# Patient Record
Sex: MALE | Race: WHITE | NOT HISPANIC OR LATINO
[De-identification: names, ages, dates, MRNs, and addresses within clinical notes are randomized per-mention and may not be internally consistent; named-entity substitution may affect disease eponyms.]

---

## 2020-05-11 ENCOUNTER — TRANSCRIPTION ENCOUNTER (OUTPATIENT)
Age: 40
End: 2020-05-11

## 2022-01-06 ENCOUNTER — OUTPATIENT (OUTPATIENT)
Dept: OUTPATIENT SERVICES | Facility: HOSPITAL | Age: 42
LOS: 1 days | Discharge: HOME | End: 2022-01-06
Payer: COMMERCIAL

## 2022-01-06 VITALS
SYSTOLIC BLOOD PRESSURE: 105 MMHG | HEIGHT: 71 IN | DIASTOLIC BLOOD PRESSURE: 73 MMHG | TEMPERATURE: 98 F | HEART RATE: 69 BPM | OXYGEN SATURATION: 99 % | WEIGHT: 201.06 LBS

## 2022-01-06 DIAGNOSIS — Z01.818 ENCOUNTER FOR OTHER PREPROCEDURAL EXAMINATION: ICD-10-CM

## 2022-01-06 DIAGNOSIS — M54.16 RADICULOPATHY, LUMBAR REGION: ICD-10-CM

## 2022-01-06 DIAGNOSIS — M77.9 ENTHESOPATHY, UNSPECIFIED: Chronic | ICD-10-CM

## 2022-01-06 LAB
ANION GAP SERPL CALC-SCNC: 4 MMOL/L — LOW (ref 5–17)
APTT BLD: 36.7 SEC — HIGH (ref 27.5–35.5)
BLD GP AB SCN SERPL QL: SIGNIFICANT CHANGE UP
BUN SERPL-MCNC: 13 MG/DL — SIGNIFICANT CHANGE UP (ref 7–23)
CALCIUM SERPL-MCNC: 9.1 MG/DL — SIGNIFICANT CHANGE UP (ref 8.5–10.1)
CHLORIDE SERPL-SCNC: 105 MMOL/L — SIGNIFICANT CHANGE UP (ref 96–108)
CO2 SERPL-SCNC: 31 MMOL/L — SIGNIFICANT CHANGE UP (ref 22–31)
CREAT SERPL-MCNC: 1.07 MG/DL — SIGNIFICANT CHANGE UP (ref 0.5–1.3)
GLUCOSE SERPL-MCNC: 78 MG/DL — SIGNIFICANT CHANGE UP (ref 70–99)
HCT VFR BLD CALC: 44.9 % — SIGNIFICANT CHANGE UP (ref 39–50)
HGB BLD-MCNC: 15.2 G/DL — SIGNIFICANT CHANGE UP (ref 13–17)
INR BLD: 1.06 RATIO — SIGNIFICANT CHANGE UP (ref 0.88–1.16)
MCHC RBC-ENTMCNC: 29.7 PG — SIGNIFICANT CHANGE UP (ref 27–34)
MCHC RBC-ENTMCNC: 33.9 GM/DL — SIGNIFICANT CHANGE UP (ref 32–36)
MCV RBC AUTO: 87.9 FL — SIGNIFICANT CHANGE UP (ref 80–100)
NRBC # BLD: 0 /100 WBCS — SIGNIFICANT CHANGE UP (ref 0–0)
PLATELET # BLD AUTO: 256 K/UL — SIGNIFICANT CHANGE UP (ref 150–400)
POTASSIUM SERPL-MCNC: 3.8 MMOL/L — SIGNIFICANT CHANGE UP (ref 3.5–5.3)
POTASSIUM SERPL-SCNC: 3.8 MMOL/L — SIGNIFICANT CHANGE UP (ref 3.5–5.3)
PROTHROM AB SERPL-ACNC: 12.3 SEC — SIGNIFICANT CHANGE UP (ref 10.6–13.6)
RBC # BLD: 5.11 M/UL — SIGNIFICANT CHANGE UP (ref 4.2–5.8)
RBC # FLD: 12.5 % — SIGNIFICANT CHANGE UP (ref 10.3–14.5)
SODIUM SERPL-SCNC: 140 MMOL/L — SIGNIFICANT CHANGE UP (ref 135–145)
WBC # BLD: 9.2 K/UL — SIGNIFICANT CHANGE UP (ref 3.8–10.5)
WBC # FLD AUTO: 9.2 K/UL — SIGNIFICANT CHANGE UP (ref 3.8–10.5)

## 2022-01-06 PROCEDURE — 93010 ELECTROCARDIOGRAM REPORT: CPT

## 2022-01-06 RX ORDER — MULTIVIT-MIN/FERROUS GLUCONATE 9 MG/15 ML
1 LIQUID (ML) ORAL
Qty: 0 | Refills: 0 | DISCHARGE

## 2022-01-06 NOTE — H&P PST ADULT - HISTORY OF PRESENT ILLNESS
41 year old male with no significant  past medical history c/o lower back pain that raidates down left leg.  He is scheduled for a a left L3-4 antoinette laminectomy foraminotomy, partial facetomy, discectomy with image on 1/25/2022.    He denies fever, cough, SOB, recent travels, and sick contacts.

## 2022-01-06 NOTE — H&P PST ADULT - PROBLEM SELECTOR PLAN 2
labs - cbc,pt/ptt,bmp,t&s,nose cx,ekg  M/C required  preop 3 day Hibiclens instruction reviewed and given .instructed on if  nose cx positive use Mupirocin 5 days and checklist given  take routine meds DOS with sips of water. avoid NSAID and OTC supplements. verbalized understanding  information on proper nutrition , increase protein and better food choices provided in packet   Spine surgery packet

## 2022-01-06 NOTE — H&P PST ADULT - ASSESSMENT
41 year old male with no significant  past medical history c/o lower back pain that radiates down left leg.  He is scheduled for a a left L3-4 antoinette laminectomy foraminotomy, partial facetomy, discectomy with image on 2022.    CAPRINI SCORE [CLOT]    AGE RELATED RISK FACTORS                                                       MOBILITY RELATED FACTORS  [ ] Age 41-60 years                                            (1 Point)                  [ ] Bed rest                                                        (1 Point)  [ ] Age: 61-74 years                                           (2 Points)                 [ ] Plaster cast                                                   (2 Points)  [ ] Age= 75 years                                              (3 Points)                 [ ] Bed bound for more than 72 hours                 (2 Points)    DISEASE RELATED RISK FACTORS                                               GENDER SPECIFIC FACTORS  [ ] Edema in the lower extremities                       (1 Point)                  [ ] Pregnancy                                                     (1 Point)  [ ] Varicose veins                                               (1 Point)                  [ ] Post-partum < 6 weeks                                   (1 Point)             [x ] BMI > 25 Kg/m2                                            (1 Point)                  [ ] Hormonal therapy  or oral contraception          (1 Point)                 [ ] Sepsis (in the previous month)                        (1 Point)                  [ ] History of pregnancy complications                 (1 point)  [ ] Pneumonia or serious lung disease                                               [ ] Unexplained or recurrent                     (1 Point)           (in the previous month)                               (1 Point)  [ ] Abnormal pulmonary function test                     (1 Point)                 SURGERY RELATED RISK FACTORS  [ ] Acute myocardial infarction                              (1 Point)                 [ ]  Section                                             (1 Point)  [ ] Congestive heart failure (in the previous month)  (1 Point)               [ ] Minor surgery                                                  (1 Point)   [ ] Inflammatory bowel disease                             (1 Point)                 [ ] Arthroscopic surgery                                        (2 Points)  [ ] Central venous access                                      (2 Points)                [x ] General surgery lasting more than 45 minutes   (2 Points)       [ ] Stroke (in the previous month)                          (5 Points)               [ ] Elective arthroplasty                                         (5 Points)                                                                                                                                               HEMATOLOGY RELATED FACTORS                                                 TRAUMA RELATED RISK FACTORS  [ ] Prior episodes of VTE                                     (3 Points)                [ ] Fracture of the hip, pelvis, or leg                       (5 Points)  [ ] Positive family history for VTE                         (3 Points)                 [ ] Acute spinal cord injury (in the previous month)  (5 Points)  [ ] Prothrombin 75119 A                                     (3 Points)                 [ ] Paralysis  (less than 1 month)                             (5 Points)  [ ] Factor V Leiden                                             (3 Points)                  [ ] Multiple Trauma within 1 month                        (5 Points)  [ ] Lupus anticoagulants                                     (3 Points)                                                           [ ] Anticardiolipin antibodies                               (3 Points)                                                       [ ] High homocysteine in the blood                      (3 Points)                                             [ ] Other congenital or acquired thrombophilia      (3 Points)                                                [ ] Heparin induced thrombocytopenia                  (3 Points)                                          Total Score [      3    ]    Caprini Score 0 - 2:  Low Risk, No VTE Prophylaxis required for most patients, encourage ambulation  Caprini Score 3 - 6:  At Risk, pharmacologic VTE prophylaxis is indicated for most patients (in the absence of a contraindication)  Caprini Score Greater than or = 7:  High Risk, pharmacologic VTE prophylaxis is indicated for most patients (in the absence of a contraindication)

## 2022-01-06 NOTE — H&P PST ADULT - PROBLEM SELECTOR PLAN 1
left L3-4 antoinette laminectomy foraminotomy, partial facetomy, discectomy with image on 1/25/2022.

## 2022-01-07 LAB
A1C WITH ESTIMATED AVERAGE GLUCOSE RESULT: 5.2 % — SIGNIFICANT CHANGE UP (ref 4–5.6)
ESTIMATED AVERAGE GLUCOSE: 103 MG/DL — SIGNIFICANT CHANGE UP (ref 68–114)
MRSA PCR RESULT.: SIGNIFICANT CHANGE UP
S AUREUS DNA NOSE QL NAA+PROBE: SIGNIFICANT CHANGE UP

## 2022-01-24 ENCOUNTER — TRANSCRIPTION ENCOUNTER (OUTPATIENT)
Age: 42
End: 2022-01-24

## 2022-01-25 ENCOUNTER — OUTPATIENT (OUTPATIENT)
Dept: OUTPATIENT SERVICES | Facility: HOSPITAL | Age: 42
LOS: 1 days | Discharge: HOME | End: 2022-01-25

## 2022-01-25 ENCOUNTER — RESULT REVIEW (OUTPATIENT)
Age: 42
End: 2022-01-25

## 2022-01-25 DIAGNOSIS — M51.16 INTERVERTEBRAL DISC DISORDERS WITH RADICULOPATHY, LUMBAR REGION: ICD-10-CM

## 2022-01-25 DIAGNOSIS — M54.16 RADICULOPATHY, LUMBAR REGION: ICD-10-CM

## 2022-01-25 DIAGNOSIS — M77.9 ENTHESOPATHY, UNSPECIFIED: Chronic | ICD-10-CM

## 2022-01-31 PROBLEM — Z78.9 OTHER SPECIFIED HEALTH STATUS: Chronic | Status: ACTIVE | Noted: 2022-01-06

## 2022-04-07 PROBLEM — Z00.00 ENCOUNTER FOR PREVENTIVE HEALTH EXAMINATION: Status: ACTIVE | Noted: 2022-04-07

## 2022-04-27 ENCOUNTER — APPOINTMENT (OUTPATIENT)
Dept: ORTHOPEDIC SURGERY | Facility: CLINIC | Age: 42
End: 2022-04-27
Payer: COMMERCIAL

## 2022-04-27 VITALS — HEIGHT: 71 IN | WEIGHT: 195 LBS | BODY MASS INDEX: 27.3 KG/M2

## 2022-04-27 DIAGNOSIS — Z78.9 OTHER SPECIFIED HEALTH STATUS: ICD-10-CM

## 2022-04-27 PROCEDURE — 99214 OFFICE O/P EST MOD 30 MIN: CPT

## 2022-04-27 NOTE — HISTORY OF PRESENT ILLNESS
[Lower back] : lower back [10] : 10 [0] : 0 [de-identified] : 1/25/22 LEFT L3/4 DISCECTOMY\par 2v lumbar spine- DDD L3/4 crest at L4/5\par MRI L spine 11/5/21-\par T12-L1: Right-sided disc herniation without compression of the conus medullaris or right L1 nerve root in the lateral\par recess.\par L1-2: Normal.\par L2-3: Disc bulge with a central right-sided disc herniation with significant compression of the thecal sac and the right\par L3 nerve root in the lateral recess.\par L3-4: Disc bulge with a left-sided disc herniation with significant compression of the thecal sac and the left L4 nerve\par root in the lateral recess.\par L4-5: Mild right-sided facet osteoarthrosis.\par L5-S1: Normal.\par Ind. review- L L3/4 HNP w/ LR narrowing R paracentral HNP L2/3 with LR narrowing\par \par Here as NC from SIMEON Umanzor, see his notes below:\par 10-18-21- He states 3 month h/o left sided back pain with posterior leg radicular complaints down the leg to the level\par of the knee. symptoms come on after playing some help with otc nsaids. 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 Doing PT, mild relief.\par 12/14/21- no change in sxs down the LLE posterior thigh\par 1/18/22- still LBP with pain radiating down the LLE \par 2/8/22- back soreness and reduced pain down the LLE\par 3/8/22-pain remains the same since last visit. Some days better than others. Pain in LLE has worsened. Did\par acupuncture last week unsure if provided relief.\par 4/27/22- has had formal PT for 6 weeks, has been taking diclofenac for LLE pain. Still describing pain down the LLE from the glutes to lateral leg [de-identified] : PT

## 2022-04-27 NOTE — ASSESSMENT
[FreeTextEntry1] : 1/25/22 LEFT L3/4 DISCECTOMY\par Did have have some reprieve from his LLE radicular pain, though has worsened over the past several months\par Will send for MRI w/ w/o contrast to eval for recurrent HNP/stenosis

## 2022-04-27 NOTE — IMAGING
[de-identified] : Back, including spine: Inspection of the thoracic/lumbar spine is as follows: No skin discoloration. Palpation of the\par thoracic/lumbar spine is as follows: left lumbar paraspinal tenderness. Range of motion of the thoracic and\par lumbar spine is as follows: full range of motion with mild stiffness. Strength Testing of the thoracic and lumbar\par spine is as follows: motor exam is 5/5 throughout both lower extremities with normal tone Special testing of the thoracic\par and lumbar spine is as follows: negative sitting straight leg raise on right and negative sitting straight leg raise on\par left Neurological testing of the thoracic and lumbar spine is as follows: light touch is intact throughout both lower\par extremities Gait and function is as follows: non-antalgic gait.

## 2022-05-02 ENCOUNTER — FORM ENCOUNTER (OUTPATIENT)
Age: 42
End: 2022-05-02

## 2022-05-02 ENCOUNTER — APPOINTMENT (OUTPATIENT)
Dept: MRI IMAGING | Facility: CLINIC | Age: 42
End: 2022-05-02

## 2022-05-03 ENCOUNTER — APPOINTMENT (OUTPATIENT)
Dept: MRI IMAGING | Facility: CLINIC | Age: 42
End: 2022-05-03
Payer: COMMERCIAL

## 2022-05-03 PROCEDURE — 72158 MRI LUMBAR SPINE W/O & W/DYE: CPT

## 2022-05-05 ENCOUNTER — APPOINTMENT (OUTPATIENT)
Dept: ORTHOPEDIC SURGERY | Facility: CLINIC | Age: 42
End: 2022-05-05
Payer: COMMERCIAL

## 2022-05-05 VITALS — BODY MASS INDEX: 27.3 KG/M2 | HEIGHT: 71 IN | WEIGHT: 195 LBS

## 2022-05-05 PROCEDURE — 99214 OFFICE O/P EST MOD 30 MIN: CPT

## 2022-05-05 NOTE — IMAGING
[de-identified] : Back, including spine: Inspection of the thoracic/lumbar spine is as follows: No skin discoloration. Palpation of the\par thoracic/lumbar spine is as follows: left lumbar paraspinal tenderness. Range of motion of the thoracic and\par lumbar spine is as follows: full range of motion with mild stiffness. Strength Testing of the thoracic and lumbar\par spine is as follows: motor exam is 5/5 throughout both lower extremities with normal tone Special testing of the thoracic\par and lumbar spine is as follows: negative sitting straight leg raise on right and negative sitting straight leg raise on\par left Neurological testing of the thoracic and lumbar spine is as follows: light touch is intact throughout both lower\par extremities Gait and function is as follows: non-antalgic gait.

## 2022-05-05 NOTE — ASSESSMENT
[FreeTextEntry1] : 1/25/22 LEFT L3/4 DISCECTOMY\par No recurrent HNP on MRI 5/3/22 though with enhancing granulaiton tissue\par Pain to discuss MICHAELLE to help calm down inflammation\par

## 2022-05-05 NOTE — HISTORY OF PRESENT ILLNESS
[Gradual] : gradual [10] : 10 [5] : 5 [Sharp] : sharp [Intermittent] : intermittent [Rest] : rest [Standing] : standing [de-identified] : 1/25/22 LEFT L3/4 DISCECTOMY\par 2v lumbar spine- DDD L3/4 crest at L4/5\par MRI L spine 11/5/21-\par T12-L1: Right-sided disc herniation without compression of the conus medullaris or right L1 nerve root in the lateral\par recess.\par L1-2: Normal.\par L2-3: Disc bulge with a central right-sided disc herniation with significant compression of the thecal sac and the right\par L3 nerve root in the lateral recess.\par L3-4: Disc bulge with a left-sided disc herniation with significant compression of the thecal sac and the left L4 nerve\par root in the lateral recess.\par L4-5: Mild right-sided facet osteoarthrosis.\par L5-S1: Normal.\par Ind. review- L L3/4 HNP w/ LR narrowing R paracentral HNP L2/3 with LR narrowing\par \par L MRI 5/5/22- report noted in chart. \par Ind. review- agree enhancing tissue L L3/4\par \par Here as NC from SIMEON Umanzor, see his notes below:\par 10-18-21- He states 3 month h/o left sided back pain with posterior leg radicular complaints down the leg to the level\par of the knee. symptoms come on after playing some help with otc nsaids. 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 Doing PT, mild relief.\par 12/14/21- no change in sxs down the LLE posterior thigh\par 1/18/22- still LBP with pain radiating down the LLE \par 2/8/22- back soreness and reduced pain down the LLE\par 3/8/22-pain remains the same since last visit. Some days better than others. Pain in LLE has worsened. Did\par acupuncture last week unsure if provided relief.\par 4/27/22- has had formal PT for 6 weeks, has been taking diclofenac for LLE pain. Still describing pain down the LLE from the glutes to lateral leg\par 5/5/22- MRI w/ w/o contrast f/u [] : no [FreeTextEntry5] : 05/05/2022: a 42 yo male, presents for lower back pain follow up. still experiencing calfs pain. the calf pain is worse with activities, it radiates upward through the hip to the lower back. the calf pain is worse in standing than walking. no fever and no calf swelling.no stiffness/tingling/numbness. taken diclofenac and gabapentin. PT for 5 weeks, last session of PT was 04/2022. he stopped PT because he felt PT was making the pain worse. [FreeTextEntry7] : upward to the lower back [de-identified] : activities

## 2022-05-06 ENCOUNTER — APPOINTMENT (OUTPATIENT)
Dept: PAIN MANAGEMENT | Facility: CLINIC | Age: 42
End: 2022-05-06
Payer: COMMERCIAL

## 2022-05-06 VITALS — HEIGHT: 71 IN | WEIGHT: 197 LBS | BODY MASS INDEX: 27.58 KG/M2

## 2022-05-06 PROCEDURE — 99244 OFF/OP CNSLTJ NEW/EST MOD 40: CPT

## 2022-05-06 NOTE — DISCUSSION/SUMMARY
[Surgical risks reviewed] : Surgical risks reviewed [de-identified] : After discussing various treatment options with the patient including but not limited to oral medications, physical therapy, exercise,\par modalities as well as interventional spinal injections, we have decided with the following plan\par \par I personally reviewed the MRI/CT scan images and agree with the radiologist's report. The\par radiological findings were discussed with the patient.\par \par \par The risks, benefits, contents and alternatives to injection were explained in full to the patient. Risks outlined include but are not\par limited to infection,sepsis, bleeding, post-dural puncture headache, nerve damage, temporary increase in pain, syncopal episode,\par failure to resolve symptoms, allergic reaction, symptom recurrence, and elevation of blood sugar in diabetics. Cortisone may cause\par immunosuppression. Patient understands the risks. All questions were answered. After discussion of options, patient requested\par an injection. Information regarding the injection was given to the patient. Which medications to stop prior to the injection was\par explained to the patient as well.\par \par Follow up in 1-2 weeks post injection for re-evaluation.\par \par  Conservative Care\par Continue Home exercises, stretching, activity modification, physical therapy, and conservative care.\par

## 2022-05-06 NOTE — PHYSICAL EXAM
[Normal Coordination] : normal coordination [Normal DTR UE/LE] : normal DTR UE/LE  [Normal Sensation] : normal sensation [Normal Mood and Affect] : normal mood and affect [Orientated] : orientated [Able to Communicate] : able to communicate [Normal Skin] : normal skin [No Rash] : no rash [No Ulcers] : no ulcers [No Lesions] : no lesions [No obvious lymphadenopathy in areas examined] : no obvious lymphadenopathy in areas examined [Well Developed] : well developed [Well Nourished] : well nourished [No Respiratory Distress] : no respiratory distress [Bending to left] : bending to left [Bending to right] : bending to right [] : motor exam is non-focal throughout both lower extremities

## 2022-05-06 NOTE — CONSULT LETTER
[Dear  ___] : Dear  [unfilled], [Consult Letter:] : I had the pleasure of evaluating your patient, [unfilled]. [Please see my note below.] : Please see my note below. [Consult Closing:] : Thank you very much for allowing me to participate in the care of this patient.  If you have any questions, please do not hesitate to contact me. [Sincerely,] : Sincerely, [FreeTextEntry3] : Colton Epstein MD\par

## 2022-05-06 NOTE — HISTORY OF PRESENT ILLNESS
[Lower back] : lower back [Left Leg] : left leg [Right Leg] : right leg [2] : 2 [8] : 8 [Sharp] : sharp [Household chores] : household chores [Leisure] : leisure [Work] : work [Social interactions] : social interactions [Rest] : rest [Meds] : meds [Ice] : ice [Standing] : standing [Physical therapy] : physical therapy [de-identified] : Pt notes back pain with radiation to LLE. This began sudden onset approx 1.5 years ago likely triggered by lifting a fridge up stairs. He tried conservative treatment no epidurals but NSAIDs which were helpful eventually he had surgery Jan 2022 which did not fully resolve his symptoms, he started PT 6 weeks post op and made pain worse. Denies bowel/bladder symptoms. He notes radicular pain now in both legs which he states is worse than pre-op. MRI reviewed. Pain is worse with activity.  [] : no

## 2022-05-18 ENCOUNTER — APPOINTMENT (OUTPATIENT)
Dept: PAIN MANAGEMENT | Facility: CLINIC | Age: 42
End: 2022-05-18
Payer: COMMERCIAL

## 2022-05-18 PROCEDURE — 64483 NJX AA&/STRD TFRM EPI L/S 1: CPT | Mod: RT

## 2022-05-18 NOTE — PROCEDURE
[FreeTextEntry3] : Date of Service: 05/18/2022 \par \par Account: [id number] \par \par Patient: RANDA DIXON \par \par YOB: 1980 \par \par Age: 41 year \par \par Surgeon: Colton Epstein M.D. \par \par Assistant: None.\par  \par Pre-Operative Diagnosis: Lumbosacral Radiculitis (M54.17) \par  \par Post Operative Diagnosis: Same \par  \par Procedure:  Right L3-4 transforaminal epidural steroid injection \par                       Left L3-4 transforaminal epidural steroid injection under fluoroscopic guidance. \par \par  \par Anesthesia: MAC \par  \par This procedure was carried out using fluoroscopic guidance.  The risks and benefits of the procedure were discussed extensively with the patient.  The consent of the patient was obtained and the following procedure was performed. The patient was placed in the prone position on the fluoroscopic table and the lumbar area was prepped and draped in a sterile fashion.\par \par The left L3-4 neural foramen was then identified on left oblique "safia dog" anatomical view at the 6 o' clock position using fluoroscopic guidance, and the area was marked. The overlying skin and subcutaneous structures were anesthetized using sterile technique with 1% Lidocaine.  A 22 gauge spinal needle was directed toward the inferior (6 o'clock) position of the pedicle, which formed the roof of the identified foramen.  Once in the epidural space, after negative aspiration for heme and CSF, 1cc of Omnipaque contrast was injected to confirm epidural location and assess filling defects and rule out intravascular needle placement. \par \par Lumbar Epidurogram showed no intravascular or intrathecal flow pattern.  No blood or CSF was aspirated. Omnipaque spread medially in epidural space and outlined the exiting nerve root.  \par \par After this, an injectate of 3 cc preservative free normal saline plus 40 mg of depo-medrol was injected in the epidural space.\par \par The right L3-4 neural foramen was then identified on right oblique "safia dog" anatomical view at the 6 o' clock position using fluoroscopic guidance, and the area was marked. The overlying skin and subcutaneous structures were anesthetized using sterile technique with 1% Lidocaine.  A 22 gauge spinal needle was directed toward the inferior (6 o'clock) position of the pedicle, which formed the roof of the identified foramen.  Once in the epidural space, after negative aspiration for heme and CSF, 1cc of Omnipaque contrast was injected to confirm epidural location and assess filling defects and rule out intravascular needle placement. \par \par Lumbar Epidurogram showed no intravascular or intrathecal flow pattern.  No blood or CSF was aspirated. Omnipaque spread medially in epidural space and outlined the exiting nerve root.  \par \par After this, an injectate of 3 cc preservative free normal saline plus 40 mg of depo-medrol was injected in the epidural space.\par \par The needle was subsequently removed.  Vital signs remained normal.  Pulse oximeter was used throughout the procedure and the patient's pulse and oxygen saturation remained within normal limits.  The patient tolerated the procedure well.  There were no complications.  The patient was instructed to apply ice over the injection sites for twenty minutes every two hours for the next 24 to 48 hours.  The patient was also instructed to contact me immediately if there were any problems.\par \par Colton Epstein M.D.\par

## 2022-05-19 ENCOUNTER — APPOINTMENT (OUTPATIENT)
Dept: PAIN MANAGEMENT | Facility: CLINIC | Age: 42
End: 2022-05-19

## 2022-06-09 ENCOUNTER — APPOINTMENT (OUTPATIENT)
Dept: PAIN MANAGEMENT | Facility: CLINIC | Age: 42
End: 2022-06-09
Payer: COMMERCIAL

## 2022-06-09 VITALS — HEIGHT: 71 IN | WEIGHT: 185 LBS | BODY MASS INDEX: 25.9 KG/M2

## 2022-06-09 PROCEDURE — 99214 OFFICE O/P EST MOD 30 MIN: CPT

## 2022-06-09 NOTE — ASSESSMENT
[FreeTextEntry1] : TFESI with minimal relief 20-30% pain and burning mainly in L leg, c.o heaviness in legs with standing\par MRI reviewed. Will proceed with repeat injection. \par

## 2022-06-09 NOTE — HISTORY OF PRESENT ILLNESS
[Lower back] : lower back [Burning] : burning [Tingling] : tingling [Constant] : constant [Work] : work [Sleep] : sleep [Nothing helps with pain getting better] : Nothing helps with pain getting better [Standing] : standing [Full time] : Work status: full time [1] : 1 [Left Leg] : left leg [Right Leg] : right leg [2] : 2 [8] : 8 [Sharp] : sharp [Household chores] : household chores [Leisure] : leisure [Social interactions] : social interactions [Rest] : rest [Meds] : meds [Ice] : ice [Physical therapy] : physical therapy [] : no [FreeTextEntry7] : left leg  [de-identified] : 2022 [de-identified] : mri l spine  [TWNoteComboBox1] : 20% [de-identified] : Pt notes back pain with radiation to LLE. This began sudden onset approx 1.5 years ago likely triggered by lifting a fridge up stairs. He tried conservative treatment no epidurals but NSAIDs which were helpful eventually he had surgery Jan 2022 which did not fully resolve his symptoms, he started PT 6 weeks post op and made pain worse. Denies bowel/bladder symptoms. He notes radicular pain now in both legs which he states is worse than pre-op. MRI reviewed. Pain is worse with activity.

## 2022-06-09 NOTE — DISCUSSION/SUMMARY
[Surgical risks reviewed] : Surgical risks reviewed [de-identified] : After discussing various treatment options with the patient including but not limited to oral medications, physical therapy, exercise,\par modalities as well as interventional spinal injections, we have decided with the following plan\par \par I personally reviewed the MRI/CT scan images and agree with the radiologist's report. The\par radiological findings were discussed with the patient.\par \par \par The risks, benefits, contents and alternatives to injection were explained in full to the patient. Risks outlined include but are not\par limited to infection,sepsis, bleeding, post-dural puncture headache, nerve damage, temporary increase in pain, syncopal episode,\par failure to resolve symptoms, allergic reaction, symptom recurrence, and elevation of blood sugar in diabetics. Cortisone may cause\par immunosuppression. Patient understands the risks. All questions were answered. After discussion of options, patient requested\par an injection. Information regarding the injection was given to the patient. Which medications to stop prior to the injection was\par explained to the patient as well.\par \par Follow up in 1-2 weeks post injection for re-evaluation.\par \par  Conservative Care\par Continue Home exercises, stretching, activity modification, physical therapy, and conservative care.\par

## 2022-06-21 ENCOUNTER — APPOINTMENT (OUTPATIENT)
Dept: ORTHOPEDIC SURGERY | Facility: CLINIC | Age: 42
End: 2022-06-21
Payer: COMMERCIAL

## 2022-06-21 DIAGNOSIS — Z98.890 OTHER SPECIFIED POSTPROCEDURAL STATES: ICD-10-CM

## 2022-06-21 DIAGNOSIS — M54.17 RADICULOPATHY, LUMBOSACRAL REGION: ICD-10-CM

## 2022-06-21 PROCEDURE — 99214 OFFICE O/P EST MOD 30 MIN: CPT

## 2022-06-21 NOTE — RETURN TO WORK/SCHOOL
[Other: ___] : [unfilled] [FreeTextEntry1] : Current condition and degree of disability precludes any travel.

## 2022-06-21 NOTE — IMAGING
[de-identified] : Back, including spine:\par \par Inspection of the thoracic/lumbar spine is as follows: No skin discoloration. \par \par Palpation of the thoracic/lumbar spine is as follows: left lumbar paraspinal tenderness. \par \par Range of motion of the thoracic and lumbar spine is as follows: full range of motion with mild stiffness. \par \par Strength Testing of the thoracic and lumbar spine is as follows: motor exam is 5/5 throughout both lower extremities with normal tone \par \par Special testing of the thoracic and lumbar spine is as follows: negative sitting straight leg raise on right and negative sitting straight leg raise on left Neurological testing of the thoracic and lumbar spine is as follows: light touch is intact throughout both lower\par extremities \par \par Gait and function is as follows: non-antalgic gait.

## 2022-06-21 NOTE — ASSESSMENT
[FreeTextEntry1] : 1/25/22 LEFT L3/4 DISCECTOMY\par No recurrent HNP on MRI 5/3/22 though with enhancing granulaiton tissue\par Continue PT/meds\par

## 2022-06-21 NOTE — HISTORY OF PRESENT ILLNESS
[Gradual] : gradual [5] : 5 [Sharp] : sharp [Intermittent] : intermittent [Rest] : rest [Standing] : standing [8] : 8 [de-identified] : 1/25/22 LEFT L3/4 DISCECTOMY\par 2v lumbar spine- DDD L3/4 crest at L4/5\par MRI L spine 11/5/21-\par T12-L1: Right-sided disc herniation without compression of the conus medullaris or right L1 nerve root in the lateral\par recess.\par L1-2: Normal.\par L2-3: Disc bulge with a central right-sided disc herniation with significant compression of the thecal sac and the right\par L3 nerve root in the lateral recess.\par L3-4: Disc bulge with a left-sided disc herniation with significant compression of the thecal sac and the left L4 nerve\par root in the lateral recess.\par L4-5: Mild right-sided facet osteoarthrosis.\par L5-S1: Normal.\par Ind. review- L L3/4 HNP w/ LR narrowing R paracentral HNP L2/3 with LR narrowing\par \par L MRI 5/5/22- report noted in chart. \par Ind. review- agree enhancing tissue L L3/4\par \par Here as NC from SIMEON Umanzor, see his notes below:\par 10-18-21- He states 3 month h/o left sided back pain with posterior leg radicular complaints down the leg to the level\par of the knee. symptoms come on after playing some help with otc nsaids. 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 Doing PT, mild relief.\par 12/14/21- no change in sxs down the LLE posterior thigh\par 1/18/22- still LBP with pain radiating down the LLE \par 2/8/22- back soreness and reduced pain down the LLE\par 3/8/22-pain remains the same since last visit. Some days better than others. Pain in LLE has worsened. Did\par acupuncture last week unsure if provided relief.\par 4/27/22- has had formal PT for 6 weeks, has been taking diclofenac for LLE pain. Still describing pain down the LLE from the glutes to lateral leg\par 5/5/22- MRI w/ w/o contrast f/u\par 6/21/22-Continued LBP with LLE pain in posterior thigh/calf. Beginning to have similar pain in the RLE as well. Has been going to PT. Had LESI on 5/18/22, with no relief (states he had increased burning/tingling in the LEs since). EMG done today.   [] : no [FreeTextEntry5] : 05/05/2022: a 42 yo male, presents for lower back pain follow up. still experiencing calfs pain. the calf pain is worse with activities, it radiates upward through the hip to the lower back. the calf pain is worse in standing than walking. no fever and no calf swelling.no stiffness/tingling/numbness. taken diclofenac and gabapentin. PT for 5 weeks, last session of PT was 04/2022. he stopped PT because he felt PT was making the pain worse. [FreeTextEntry7] : upward to the lower back [de-identified] : activities [de-identified] : Cortisone shot, PT

## 2022-07-19 ENCOUNTER — FORM ENCOUNTER (OUTPATIENT)
Age: 42
End: 2022-07-19

## 2022-08-24 ENCOUNTER — APPOINTMENT (OUTPATIENT)
Dept: ORTHOPEDIC SURGERY | Facility: CLINIC | Age: 42
End: 2022-08-24

## 2022-11-30 ENCOUNTER — RX RENEWAL (OUTPATIENT)
Age: 42
End: 2022-11-30

## 2022-11-30 RX ORDER — GABAPENTIN 100 MG/1
100 CAPSULE ORAL
Qty: 60 | Refills: 1 | Status: ACTIVE | COMMUNITY
Start: 2022-04-27 | End: 1900-01-01

## 2023-03-28 ENCOUNTER — NON-APPOINTMENT (OUTPATIENT)
Age: 43
End: 2023-03-28

## 2023-03-28 ENCOUNTER — APPOINTMENT (OUTPATIENT)
Dept: INTERNAL MEDICINE | Facility: CLINIC | Age: 43
End: 2023-03-28
Payer: COMMERCIAL

## 2023-03-28 VITALS
OXYGEN SATURATION: 99 % | DIASTOLIC BLOOD PRESSURE: 80 MMHG | TEMPERATURE: 98.1 F | HEART RATE: 65 BPM | WEIGHT: 197 LBS | HEIGHT: 71 IN | RESPIRATION RATE: 16 BRPM | SYSTOLIC BLOOD PRESSURE: 118 MMHG | BODY MASS INDEX: 27.58 KG/M2

## 2023-03-28 DIAGNOSIS — Z00.00 ENCOUNTER FOR GENERAL ADULT MEDICAL EXAMINATION W/OUT ABNORMAL FINDINGS: ICD-10-CM

## 2023-03-28 DIAGNOSIS — Z13.31 ENCOUNTER FOR SCREENING FOR DEPRESSION: ICD-10-CM

## 2023-03-28 DIAGNOSIS — B00.1 HERPESVIRAL VESICULAR DERMATITIS: ICD-10-CM

## 2023-03-28 DIAGNOSIS — Z29.9 ENCOUNTER FOR PROPHYLACTIC MEASURES, UNSPECIFIED: ICD-10-CM

## 2023-03-28 PROCEDURE — 93000 ELECTROCARDIOGRAM COMPLETE: CPT | Mod: 59

## 2023-03-28 PROCEDURE — G0444 DEPRESSION SCREEN ANNUAL: CPT | Mod: 59

## 2023-03-28 PROCEDURE — 99386 PREV VISIT NEW AGE 40-64: CPT | Mod: 25

## 2023-03-28 PROCEDURE — 36415 COLL VENOUS BLD VENIPUNCTURE: CPT

## 2023-03-28 RX ORDER — VALACYCLOVIR 500 MG/1
500 TABLET, FILM COATED ORAL
Qty: 60 | Refills: 2 | Status: ACTIVE | COMMUNITY
Start: 2023-03-28 | End: 1900-01-01

## 2023-03-28 NOTE — HEALTH RISK ASSESSMENT
[No] : No [No falls in past year] : Patient reported no falls in the past year [Not at All (0)] : 9.) Thoughts that you would be off dead or of hurting yourself in some way? Not at all [PHQ-9 Negative - No further assessment needed] : PHQ-9 Negative - No further assessment needed [HIV Test offered] : HIV Test offered [Hepatitis C test offered] : Hepatitis C test offered [None] : None [With Family] : lives with family [Employed] : employed [Sexually Active] : sexually active [Feels Safe at Home] : Feels safe at home [Fully functional (bathing, dressing, toileting, transferring, walking, feeding)] : Fully functional (bathing, dressing, toileting, transferring, walking, feeding) [Fully functional (using the telephone, shopping, preparing meals, housekeeping, doing laundry, using] : Fully functional and needs no help or supervision to perform IADLs (using the telephone, shopping, preparing meals, housekeeping, doing laundry, using transportation, managing medications and managing finances) [Reports normal functional visual acuity (ie: able to read med bottle)] : Reports normal functional visual acuity [Smoke Detector] : smoke detector [Carbon Monoxide Detector] : carbon monoxide detector [Safety elements used in home] : safety elements used in home [Seat Belt] :  uses seat belt [Sunscreen] : uses sunscreen [Never] : Never [Nearly Every Day (3)] : 7.) Trouble concentrating on things, such as reading a newspaper or watching television? Nearly every day [Moderately Severe] : severity of depression is moderately severe [# of Members in Household ___] :  household currently consist of [unfilled] member(s) [College] : College [] :  [de-identified] : socially  [[id number]] : 18 [Change in mental status noted] : No change in mental status noted [Language] : denies difficulty with language [Behavior] : denies difficulty with behavior [Learning/Retaining New Information] : denies difficulty learning/retaining new information [Handling Complex Tasks] : denies difficulty handling complex tasks [Reasoning] : denies difficulty with reasoning [Spatial Ability and Orientation] : denies difficulty with spatial ability and orientation [High Risk Behavior] : no high risk behavior [Reports changes in hearing] : Reports no changes in hearing [Reports changes in vision] : Reports no changes in vision [Reports changes in dental health] : Reports no changes in dental health [Guns at Home] : no guns at home [Travel to Developing Areas] : does not  travel to developing areas [TB Exposure] : is not being exposed to tuberculosis [Caregiver Concerns] : does not have caregiver concerns [FreeTextEntry2] : Teacher

## 2023-03-28 NOTE — PHYSICAL EXAM
[No Edema] : there was no peripheral edema [Declined Breast Exam] : declined breast exam  [Declined Rectal Exam] : declined rectal exam [Normal Posterior Cervical Nodes] : no posterior cervical lymphadenopathy [Normal Anterior Cervical Nodes] : no anterior cervical lymphadenopathy [Normal] : affect was normal and insight and judgment were intact [No Joint Swelling] : no joint swelling [Grossly Normal Strength/Tone] : grossly normal strength/tone [Coordination Grossly Intact] : coordination grossly intact [No Focal Deficits] : no focal deficits [Normal Gait] : normal gait [Deep Tendon Reflexes (DTR)] : deep tendon reflexes were 2+ and symmetric [Memory Grossly Normal] : memory grossly normal [Normal Affect] : the affect was normal [Alert and Oriented x3] : oriented to person, place, and time [Normal Mood] : the mood was normal [Normal Insight/Judgement] : insight and judgment were intact [de-identified] : declined [de-identified] : straight leg test negative bilaterally. mild paraspinal tendernes b/l

## 2023-03-28 NOTE — ASSESSMENT
[FreeTextEntry1] : #CPE\par f/u blood and urine\par ekg - NSR no acute st or t wave changes - interpreted and reviewed results with pt.\par I spent 5 minutes with the patient conducting a screen using approved screening tool (PHQ9) and discussing results of said screen with patient during this encounter.\par The patient was counseled to get regular exercise and sleep, wear sunblock and wear a safety belt in the car.\par Check CBC, CMP, Hgba1c , TSH and UA\par Colonoscopy \par Ophtho\par Derm\par \par #Herpes Labialis\par -  valtrex 500mg bid for 3 days for outbreak only.\par \par #Depression\par - PHQ 9 - 18 - restart cymbalta 40mg qd\par - mood stable denies si/hi\par \par #Chronic Back pain\par - f/u Pain management\par - f/u orthopedic \par - cymbalta 40mg qd\par -  did PT no help \par \par \par pt agrees and understands plan via teach back method. all questions answered.\par

## 2023-03-28 NOTE — HISTORY OF PRESENT ILLNESS
[de-identified] : 42 year M  here for Complete Physical Exam.\par \par \par Hx of herpes labiis - used acyclvoir cream. 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 mood stable denies si/hi\par previously on duloxetine 30mg - stopped\par reports side effect of no ejaculation, however open to try again due to pain and depression.\par following psychologist\par \par \par \par Pt is daily exercising?  No\par \par Vaccinations:\par covid -  moderna and booster was pfizer\par flu - got last season\par tdap - got in past 10 years\par \par \par Screening:\par last colonoscopy: - due at 46\par no family hx of colon cancer\par Pt denies any unintentional weight loss, anemia, blood in stool or dysphagia.\par \par \par Past Medical History: \par Chronic Back Pain\par \par Allergies: NKDA\par \par Medications:\par Gabapentin 900mg (300mg tid)\par DIclofenac - prn \par \par Surgical Hx:\par Dec 2022 - toe surgery L side previously 2016 ([facility]) - Dr. Miller\par Jan 2022 - disectomy L3-L4 - with Dr. Mejias\par Aug 2022 - [facility] Dr. Deleon another discectomy with decompression surgery L2 - L3 and L3- L4 \par \par Family Hx:\par Mother: alive and hx of typeIIDm - early 70s, overweight\par Maternal Grandfather: CVA in late 60's - passed cva\par Maternal Grandmother: passed from old age\par Father: healthy early 70's\par Paternal Grandfather old age 80's\par Paternal Grandmother old age 80's\par Siblings:\par 1 brother - healthy \par Children:\par  with 2 kids: 15 and 17 (boy and girl)\par son had bone cyst in shoulder\par daughter - dermoid cyst in neck \par pt denies any family hx of breast, colon, cervical, endometrial, uterine, ovarian,  lung, prostate  or testicular cancer\par \par Social\par ETOH - socially \par Smoker - denies \par Illicit Drug use - denies\par \par 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

## 2023-03-29 ENCOUNTER — TRANSCRIPTION ENCOUNTER (OUTPATIENT)
Age: 43
End: 2023-03-29

## 2023-03-29 ENCOUNTER — NON-APPOINTMENT (OUTPATIENT)
Age: 43
End: 2023-03-29

## 2023-03-29 LAB
25(OH)D3 SERPL-MCNC: 32.1 NG/ML
ALBUMIN SERPL ELPH-MCNC: 4.8 G/DL
ALP BLD-CCNC: 73 U/L
ALT SERPL-CCNC: 18 U/L
ANION GAP SERPL CALC-SCNC: 14 MMOL/L
APPEARANCE: CLEAR
AST SERPL-CCNC: 15 U/L
BACTERIA: NEGATIVE
BASOPHILS # BLD AUTO: 0.08 K/UL
BASOPHILS NFR BLD AUTO: 0.9 %
BILIRUB SERPL-MCNC: 0.5 MG/DL
BILIRUBIN URINE: NEGATIVE
BLOOD URINE: NEGATIVE
BUN SERPL-MCNC: 11 MG/DL
CALCIUM SERPL-MCNC: 9.9 MG/DL
CHLORIDE SERPL-SCNC: 101 MMOL/L
CHOLEST SERPL-MCNC: 216 MG/DL
CO2 SERPL-SCNC: 26 MMOL/L
COLOR: NORMAL
CREAT SERPL-MCNC: 1 MG/DL
EGFR: 97 ML/MIN/1.73M2
EOSINOPHIL # BLD AUTO: 0.1 K/UL
EOSINOPHIL NFR BLD AUTO: 1.2 %
ESTIMATED AVERAGE GLUCOSE: 100 MG/DL
FOLATE SERPL-MCNC: 9.4 NG/ML
GLUCOSE QUALITATIVE U: NEGATIVE
GLUCOSE SERPL-MCNC: 78 MG/DL
HBA1C MFR BLD HPLC: 5.1 %
HBV SURFACE AB SERPL IA-ACNC: 17.6 MIU/ML
HCT VFR BLD CALC: 47.3 %
HCV AB SER QL: NONREACTIVE
HCV S/CO RATIO: 0.22 S/CO
HDLC SERPL-MCNC: 51 MG/DL
HGB BLD-MCNC: 15.4 G/DL
HYALINE CASTS: 0 /LPF
IMM GRANULOCYTES NFR BLD AUTO: 0.5 %
KETONES URINE: ABNORMAL
LDLC SERPL CALC-MCNC: 147 MG/DL
LEUKOCYTE ESTERASE URINE: NEGATIVE
LYMPHOCYTES # BLD AUTO: 2.43 K/UL
LYMPHOCYTES NFR BLD AUTO: 28.7 %
MAN DIFF?: NORMAL
MCHC RBC-ENTMCNC: 29.2 PG
MCHC RBC-ENTMCNC: 32.6 GM/DL
MCV RBC AUTO: 89.8 FL
MICROSCOPIC-UA: NORMAL
MONOCYTES # BLD AUTO: 0.74 K/UL
MONOCYTES NFR BLD AUTO: 8.7 %
NEUTROPHILS # BLD AUTO: 5.09 K/UL
NEUTROPHILS NFR BLD AUTO: 60 %
NITRITE URINE: NEGATIVE
NONHDLC SERPL-MCNC: 165 MG/DL
PH URINE: 6.5
PLATELET # BLD AUTO: 253 K/UL
POTASSIUM SERPL-SCNC: 4.2 MMOL/L
PROT SERPL-MCNC: 7.9 G/DL
PROTEIN URINE: NEGATIVE
RBC # BLD: 5.27 M/UL
RBC # FLD: 12.6 %
RED BLOOD CELLS URINE: 1 /HPF
SODIUM SERPL-SCNC: 140 MMOL/L
SPECIFIC GRAVITY URINE: 1.02
SQUAMOUS EPITHELIAL CELLS: 0 /HPF
TRIGL SERPL-MCNC: 91 MG/DL
TSH SERPL-ACNC: 1.23 UIU/ML
UROBILINOGEN URINE: NORMAL
VIT B12 SERPL-MCNC: 506 PG/ML
WBC # FLD AUTO: 8.48 K/UL
WHITE BLOOD CELLS URINE: 0 /HPF

## 2023-03-30 ENCOUNTER — NON-APPOINTMENT (OUTPATIENT)
Age: 43
End: 2023-03-30

## 2023-05-04 ENCOUNTER — APPOINTMENT (OUTPATIENT)
Dept: INTERNAL MEDICINE | Facility: CLINIC | Age: 43
End: 2023-05-04
Payer: COMMERCIAL

## 2023-05-04 VITALS
OXYGEN SATURATION: 97 % | HEART RATE: 75 BPM | HEIGHT: 71 IN | TEMPERATURE: 98.1 F | DIASTOLIC BLOOD PRESSURE: 79 MMHG | WEIGHT: 193 LBS | BODY MASS INDEX: 27.02 KG/M2 | SYSTOLIC BLOOD PRESSURE: 119 MMHG

## 2023-05-04 DIAGNOSIS — J20.8 ACUTE BRONCHITIS DUE TO OTHER SPECIFIED ORGANISMS: ICD-10-CM

## 2023-05-04 PROCEDURE — 99214 OFFICE O/P EST MOD 30 MIN: CPT

## 2023-05-04 RX ORDER — FLUTICASONE PROPIONATE 50 UG/1
50 SPRAY, METERED NASAL DAILY
Qty: 1 | Refills: 0 | Status: ACTIVE | COMMUNITY
Start: 2023-05-04 | End: 1900-01-01

## 2023-05-04 NOTE — PHYSICAL EXAM
[No Lymphadenopathy] : no lymphadenopathy [No Respiratory Distress] : no respiratory distress  [No Accessory Muscle Use] : no accessory muscle use [No Edema] : there was no peripheral edema [Declined Breast Exam] : declined breast exam  [Normal] : affect was normal and insight and judgment were intact [de-identified] : no erythema, no tonsillar enlargement bilaterally, no exudate bilaterally, uvula midline. +PND [de-identified] : b/l ronchi

## 2023-05-04 NOTE — REVIEW OF SYSTEMS
[Shortness Of Breath] : no shortness of breath [Wheezing] : no wheezing [Cough] : cough [Dyspnea on Exertion] : not dyspnea on exertion [Negative] : Psychiatric [FreeTextEntry6] : productive cough with green/yellow mucus

## 2023-05-04 NOTE — HEALTH RISK ASSESSMENT
[No] : No [No falls in past year] : Patient reported no falls in the past year [0] : 2) Feeling down, depressed, or hopeless: Not at all (0) [PHQ-2 Negative - No further assessment needed] : PHQ-2 Negative - No further assessment needed [[id number]] : 0 [Never] : Never

## 2023-05-04 NOTE — ASSESSMENT
[FreeTextEntry1] : #Acute Bronchitis\par zpack as directed - pt denies any allergies to azithromycin \par flonase as directed\par benzonatate 200mg tid prn for cough \par take with food\par no improvement in 3-5 days rto\par if fever, poor po intake, n/v, chest pain or sob - go to ED or contact pcp\par pt agrees and understands plan via teach back method. all questions answered.\par \par \par

## 2023-05-04 NOTE — HISTORY OF PRESENT ILLNESS
[FreeTextEntry8] : 42 M here\par c/o of productive cough yellow sputum for x 2 weeks.\par chest congestion\par worse with talking\par better with cough medication\par Denies Rhinitis, throat irritation\par Denies fever, body aches earache\par Denies SOB \par mild wheezing or CP or palpitations\par Denies NVD\par Denies HA\par + covid vaccinated\par negative home covid test\par denies any recent travel\par denies any recent sick contacts\par denies any loss of taste or smell.\par

## 2023-06-09 ENCOUNTER — APPOINTMENT (OUTPATIENT)
Dept: INTERNAL MEDICINE | Facility: CLINIC | Age: 43
End: 2023-06-09
Payer: COMMERCIAL

## 2023-06-09 VITALS
WEIGHT: 191 LBS | BODY MASS INDEX: 26.74 KG/M2 | OXYGEN SATURATION: 98 % | SYSTOLIC BLOOD PRESSURE: 107 MMHG | HEART RATE: 76 BPM | TEMPERATURE: 98.6 F | HEIGHT: 71 IN | DIASTOLIC BLOOD PRESSURE: 71 MMHG

## 2023-06-09 DIAGNOSIS — M54.50 LOW BACK PAIN, UNSPECIFIED: ICD-10-CM

## 2023-06-09 DIAGNOSIS — F32.A DEPRESSION, UNSPECIFIED: ICD-10-CM

## 2023-06-09 DIAGNOSIS — G89.29 LOW BACK PAIN, UNSPECIFIED: ICD-10-CM

## 2023-06-09 PROCEDURE — G0444 DEPRESSION SCREEN ANNUAL: CPT | Mod: 59

## 2023-06-09 PROCEDURE — 99214 OFFICE O/P EST MOD 30 MIN: CPT | Mod: 25

## 2023-06-09 RX ORDER — AZITHROMYCIN 250 MG/1
250 TABLET, FILM COATED ORAL
Qty: 1 | Refills: 0 | Status: COMPLETED | COMMUNITY
Start: 2023-05-04 | End: 2023-06-09

## 2023-06-09 RX ORDER — BENZONATATE 200 MG/1
200 CAPSULE ORAL 3 TIMES DAILY
Qty: 30 | Refills: 0 | Status: COMPLETED | COMMUNITY
Start: 2023-05-04 | End: 2023-06-09

## 2023-06-09 RX ORDER — DICLOFENAC SODIUM 75 MG/1
75 TABLET, DELAYED RELEASE ORAL DAILY
Qty: 30 | Refills: 1 | Status: COMPLETED | COMMUNITY
Start: 2022-04-27 | End: 2023-06-09

## 2023-06-09 NOTE — HEALTH RISK ASSESSMENT
[No] : No [No falls in past year] : Patient reported no falls in the past year [Nearly Every Day (3)] : 7.) Trouble concentrating on things, such as reading a newspaper or watching television? Nearly every day [Not at All (0)] : 9.) Thoughts that you would be off dead or of hurting yourself in some way? Not at all [PHQ-9 Positive] : PHQ-9 Positive [[id number]] : 18 [Never] : Never

## 2023-06-09 NOTE — HISTORY OF PRESENT ILLNESS
[FreeTextEntry8] : 42 M here for f/u \par hx of  chronic back pain lower back radiating to both legs.\par 3 years ago -lifting refrigerator since then 2 surgeries \par following pain and ortho @[facility]\par S/p last surgery from 8/2022 \par Leg pain b/l started after 2nd surgery.\par Describes as burning pain L>R\par Pt reports its 5/10 constant\par no radiation to bottom of feet\par Pt denies any acute numbness but has hx of tingling\par Pt reports nothing makes better.\par Pt reports physical therapy makes it worse\par Walking makes it better - than sitting\par Pt reports when finishes walking and sits then feels it back.\par Pt has no further.\par In addition, pain has caused pt to feel very depressed. mood stable denies si/hi\par previously on cymbalta and not compliant with medication\par would like to switch to another medication due to no help with depression or pain\par reports side effect of no ejaculation, however open to try again due to pain and depression.\par following psychologist\par Need to see pain management - pt has to make appointment\par no acute issues would like to switch from duloxetine to citalopram \par

## 2023-06-09 NOTE — ASSESSMENT
[FreeTextEntry1] : #Depression\par - mood stable denies si/hi\par - f/u psych\par - previously on duloextine - pt will like to switch to different ssri\par - avoid meloxicam with ssri\par - f/u pain management due to chronic back pain\par - rto if no improvement\par - discussed takes 6 weeks to work\par - important to be compliant with medication\par \par #Chronic Back Pain\par - f/u pain management\par - s/p discectomy\par - non compliant with duloxetine\par - physical therapy\par - no red flags\par - hx of radiculopathy \par - red flag symptoms discussed with pt - if have any plz contact pcp or go to ed\par \par pt agrees and understands plan via teach back method. all questions answered.\par

## 2023-06-09 NOTE — REVIEW OF SYSTEMS
[Back Pain] : back pain [Suicidal] : not suicidal [Insomnia] : no insomnia [Anxiety] : anxiety [Depression] : depression [Negative] : Neurological [de-identified] : mood stable denies si/hi

## 2023-06-19 RX ORDER — CITALOPRAM HYDROBROMIDE 10 MG/1
10 TABLET, FILM COATED ORAL
Qty: 90 | Refills: 1 | Status: COMPLETED | COMMUNITY
Start: 2023-06-09 | End: 2023-06-19

## 2023-06-19 RX ORDER — DULOXETINE HYDROCHLORIDE 30 MG/1
30 CAPSULE, DELAYED RELEASE PELLETS ORAL
Qty: 90 | Refills: 1 | Status: ACTIVE | COMMUNITY
Start: 2023-03-28 | End: 1900-01-01